# Patient Record
Sex: FEMALE | URBAN - METROPOLITAN AREA
[De-identification: names, ages, dates, MRNs, and addresses within clinical notes are randomized per-mention and may not be internally consistent; named-entity substitution may affect disease eponyms.]

---

## 2023-05-01 ENCOUNTER — APPOINTMENT (RX ONLY)
Dept: URBAN - METROPOLITAN AREA CLINIC 114 | Facility: CLINIC | Age: 58
Setting detail: DERMATOLOGY
End: 2023-05-01

## 2023-05-01 DIAGNOSIS — R21 RASH AND OTHER NONSPECIFIC SKIN ERUPTION: ICD-10-CM

## 2023-05-01 PROCEDURE — ? ADDITIONAL NOTES

## 2023-05-01 PROCEDURE — ? COUNSELING

## 2023-05-01 PROCEDURE — ? PRESCRIPTION

## 2023-05-01 PROCEDURE — 99204 OFFICE O/P NEW MOD 45 MIN: CPT

## 2023-05-01 PROCEDURE — ? PRESCRIPTION MEDICATION MANAGEMENT

## 2023-05-01 RX ORDER — TRIAMCINOLONE ACETONIDE 1 MG/G
OINTMENT TOPICAL
Qty: 80 | Refills: 0 | Status: ERX | COMMUNITY
Start: 2023-05-01

## 2023-05-01 RX ADMIN — TRIAMCINOLONE ACETONIDE: 1 OINTMENT TOPICAL at 00:00

## 2023-05-01 NOTE — PROCEDURE: ADDITIONAL NOTES
Render Risk Assessment In Note?: no
Additional Notes: Patient declined to have biopsies done today
Detail Level: Simple

## 2023-05-01 NOTE — PROCEDURE: PRESCRIPTION MEDICATION MANAGEMENT
Render In Strict Bullet Format?: No
Initiate Treatment: triamcinolone acetonide 0.1 % topical ointment \\nQuantity: 80.0 g  Days Supply: 30\\nSig: Apply a thin layer to itchy areas on body two times daily x 2 weeks, PRN
Detail Level: Zone

## 2025-02-02 NOTE — PROCEDURE: COUNSELING
Lt. 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 with residual thrombus so initiated on thrombolysis with TPA s/p lysis check and balloon angioplasty on 2/1/25 with plan for lysis recheck on 12/2/25     Continue multimodal Analgesia:   - IV dilaudid PCA @ 0/0.2/5/2.2, will consider switching to oral dilaudid once patient is done with interventions and can tolerate diet (oxy has not been effective in the past)   - Continue IV dilaudid 0.5mg Q3H for breakthrough pain   - Increase gabapentin to home dose 300mg BID + 600mg HS   - Increase po robaxin to 750mg Q6H for muscle spasms   - Start IV valium 5mg Q8H prn for muscle spasms   - Continue po tylenol 975mg Q6H   
Detail Level: Detailed
Patient Specific Counseling (Will Not Stick From Patient To Patient): .\\n.\\n5/1\\nGiven exam I am worried about lupus rash, I highly recommend punch biopsies, pt decided herself not to do these \\nShe just wants to try a cream \\nI explained to her that until biopsies are done her dx is unclear